# Patient Record
(demographics unavailable — no encounter records)

---

## 2025-04-07 NOTE — DISCUSSION/SUMMARY
[EKG obtained to assist in diagnosis and management of assessed problem(s)] : EKG obtained to assist in diagnosis and management of assessed problem(s) [FreeTextEntry1] : 61year woman with a history as listed presents for an initial cardiac evaluation.  Melisa is doing well. She denies any anginal symptoms. Clinically she is euvolemic on exam. Her EKG did not reveal any significant ischemic changes.  echocardiogram and stress testing demonstrate normal LVEF and stress testing is without exercise induced ischemia. Her blood pressure has improved. She will continue Olmesartan 10mg Qday.  She can remain off Toprol  due to her concern about symptoms (not feeling well).   Exercise and diet counseling was performed in order to reduce her future cardiovascular risk.  She will follow-up with me in 6 month or sooner if necessary.

## 2025-04-07 NOTE — HISTORY OF PRESENT ILLNESS
[FreeTextEntry1] : 61 year old woman with a history of HTN presents for a follow-up visit.   Since her last visit, she has been feeling better. She has been very busy with her grandchildren. She has been trying to stay active. She is trying to walk 3 days week for 3 miles.  She   denies any chest pain, PND, orthopnea, lower extremity edema, near syncope, syncope, strokelike symptoms. She has not been monitoring her  BP.  Medication reconciliation performed. She is compliant with her medications.

## 2025-04-07 NOTE — CARDIOLOGY SUMMARY
[de-identified] : Sinus Rhythm  Anteroseptal infarct  nonspecific ST changes [de-identified] : 10/2024 [facility]  119/76 [de-identified] : 3/2024 exercise 10.5 METS [de-identified] : 11/21/23 normal LV function.  63%, trace MR

## 2025-05-09 NOTE — HISTORY OF PRESENT ILLNESS
[Patient reported colonoscopy was normal] : Patient reported colonoscopy was normal [Yes] : Patient has concerns regarding sex [Currently Active] : currently active [Men] : men [Vaginal] : vaginal [TextBox_4] : No vb, on yuvafem now for vaginal dryness, needs refill. Still on Fosamax for osteoporosis. She is taking vit d and exercising, uses weighted vest when walking.  t score now -2.4 spine left femoral -2.7 (was -2.5)   [Mammogramdate] : 6/2024 [PapSmeardate] : 2023 [BoneDensityDate] : fall 2024 [ColonoscopyDate] : farnaz 11/2021 [FreeTextEntry1] : dryness and pain